# Patient Record
Sex: MALE | Race: WHITE | Employment: FULL TIME | URBAN - METROPOLITAN AREA
[De-identification: names, ages, dates, MRNs, and addresses within clinical notes are randomized per-mention and may not be internally consistent; named-entity substitution may affect disease eponyms.]

---

## 2022-02-25 ENCOUNTER — HOSPITAL ENCOUNTER (OUTPATIENT)
Dept: LAB | Age: 27
Discharge: HOME | End: 2022-02-25
Payer: COMMERCIAL

## 2022-02-25 DIAGNOSIS — R42 DIZZINESS: ICD-10-CM

## 2022-02-25 DIAGNOSIS — R20.0 NUMBNESS AND TINGLING: ICD-10-CM

## 2022-02-25 DIAGNOSIS — R20.2 NUMBNESS AND TINGLING: ICD-10-CM

## 2022-02-25 LAB
ALBUMIN SERPL-MCNC: 4.3 G/DL (ref 3.5–5)
ALBUMIN/GLOB SERPL: 1.3 {RATIO} (ref 1.2–3.5)
ALP SERPL-CCNC: 41 U/L (ref 50–136)
ALT SERPL-CCNC: 23 U/L (ref 12–65)
ANION GAP SERPL CALC-SCNC: 5 MMOL/L (ref 7–16)
AST SERPL-CCNC: 14 U/L (ref 15–37)
BILIRUB SERPL-MCNC: 0.5 MG/DL (ref 0.2–1.1)
BUN SERPL-MCNC: 17 MG/DL (ref 6–23)
CALCIUM SERPL-MCNC: 9.1 MG/DL (ref 8.3–10.4)
CHLORIDE SERPL-SCNC: 107 MMOL/L (ref 98–107)
CO2 SERPL-SCNC: 29 MMOL/L (ref 21–32)
CREAT SERPL-MCNC: 0.71 MG/DL (ref 0.8–1.5)
ERYTHROCYTE [DISTWIDTH] IN BLOOD BY AUTOMATED COUNT: 12.5 % (ref 11.9–14.6)
FOLATE SERPL-MCNC: 16.8 NG/ML (ref 3.1–17.5)
GLOBULIN SER CALC-MCNC: 3.2 G/DL (ref 2.3–3.5)
GLUCOSE SERPL-MCNC: 80 MG/DL (ref 65–100)
HCT VFR BLD AUTO: 40.8 % (ref 41.1–50.3)
HGB BLD-MCNC: 13.5 G/DL (ref 13.6–17.2)
MAGNESIUM SERPL-MCNC: 2.2 MG/DL (ref 1.8–2.4)
MCH RBC QN AUTO: 31.1 PG (ref 26.1–32.9)
MCHC RBC AUTO-ENTMCNC: 33.1 G/DL (ref 31.4–35)
MCV RBC AUTO: 94 FL (ref 79.6–97.8)
NRBC # BLD: 0 K/UL (ref 0–0.2)
PLATELET # BLD AUTO: 245 K/UL (ref 150–450)
PMV BLD AUTO: 10.3 FL (ref 9.4–12.3)
POTASSIUM SERPL-SCNC: 4 MMOL/L (ref 3.5–5.1)
PROT SERPL-MCNC: 7.5 G/DL (ref 6.3–8.2)
RBC # BLD AUTO: 4.34 M/UL (ref 4.23–5.6)
SODIUM SERPL-SCNC: 141 MMOL/L (ref 136–145)
TSH SERPL DL<=0.005 MIU/L-ACNC: 1.32 UIU/ML
VIT B12 SERPL-MCNC: 306 PG/ML (ref 193–986)
WBC # BLD AUTO: 5.5 K/UL (ref 4.3–11.1)

## 2022-02-25 PROCEDURE — 84443 ASSAY THYROID STIM HORMONE: CPT

## 2022-02-25 PROCEDURE — 80053 COMPREHEN METABOLIC PANEL: CPT

## 2022-02-25 PROCEDURE — 36415 COLL VENOUS BLD VENIPUNCTURE: CPT

## 2022-02-25 PROCEDURE — 85027 COMPLETE CBC AUTOMATED: CPT

## 2022-02-25 PROCEDURE — 82607 VITAMIN B-12: CPT

## 2022-02-25 PROCEDURE — 82525 ASSAY OF COPPER: CPT

## 2022-02-25 PROCEDURE — 83735 ASSAY OF MAGNESIUM: CPT

## 2022-02-25 PROCEDURE — 82746 ASSAY OF FOLIC ACID SERUM: CPT

## 2022-03-01 LAB — COPPER SERPL-MCNC: 83 UG/DL (ref 63–121)

## 2022-08-17 ENCOUNTER — OFFICE VISIT (OUTPATIENT)
Dept: NEUROLOGY | Age: 27
End: 2022-08-17
Payer: COMMERCIAL

## 2022-08-17 VITALS
WEIGHT: 195 LBS | BODY MASS INDEX: 28.88 KG/M2 | DIASTOLIC BLOOD PRESSURE: 76 MMHG | HEART RATE: 71 BPM | SYSTOLIC BLOOD PRESSURE: 128 MMHG | HEIGHT: 69 IN

## 2022-08-17 DIAGNOSIS — R42 PERSISTENT POSTURAL-PERCEPTUAL DIZZINESS: Primary | ICD-10-CM

## 2022-08-17 DIAGNOSIS — R26.89 ABNORMALITY OF GAIT DUE TO IMPAIRMENT OF BALANCE: ICD-10-CM

## 2022-08-17 PROCEDURE — 99213 OFFICE O/P EST LOW 20 MIN: CPT | Performed by: PSYCHIATRY & NEUROLOGY

## 2022-08-17 NOTE — PROGRESS NOTES
Negative for rash. Allergic/Immunologic: Negative for immunocompromised state. Neurological:  Positive for dizziness. Psychiatric/Behavioral:  Negative for confusion. The patient is nervous/anxious. Lab/Imaging Review:   I REVIEWED PERTINENT LABS, IMAGES, AND REPORTS WITH THE PATIENT PERSONALLY, DIRECTLY AND FULLY. THE MOST PERTINENT FINDINGS ARE NOTED BELOW:    MRI Brain September 2021:  Findings: No cerebral atrophy. Ventricular sizes are normal.  No evidence of hemorrhage or infarction. Thickened sinus mucosa is noted. Otherwise unremarkable. Lab Results   Component Value Date    CREATININE 0.71 (L) 02/25/2022    BUN 17 02/25/2022     02/25/2022    K 4.0 02/25/2022     02/25/2022    CO2 29 02/25/2022     Lab Results   Component Value Date    TSH 1.320 02/25/2022     Lab Results   Component Value Date    YWFVRBYO50 306 02/25/2022       Past Medical History:     Past medical history, surgical history, social history, family history, medications, and allergies were reviewed and updated as appropriate. PAST MEDICAL HISTORY:  Past Medical History:   Diagnosis Date    Anxiety     Dizziness      PAST SURGICAL HISTORY:   No past surgical history on file. FAMILY HISTORY:  No family history on file. SOCIAL HISTORY:  Social History     Socioeconomic History    Marital status:      Spouse name: None    Number of children: None    Years of education: None    Highest education level: None   Tobacco Use    Smoking status: Never    Smokeless tobacco: Never   Substance and Sexual Activity    Alcohol use: Not Currently       Medications/Allergies:     MEDICATIONS:   No outpatient encounter medications on file as of 8/17/2022. No facility-administered encounter medications on file as of 8/17/2022.        ALLERGIES:  No Known Allergies      Physical Exam:     /76   Pulse 71   Ht 5' 9\" (1.753 m)   Wt 195 lb (88.5 kg)   BMI 28.80 kg/m²     General Exam:  General: Well developed, well nourished, in no apparent distress. HEENT: Normocephalic, atraumatic. Sclera anicteric. Oropharynx clear. Neck: Supple without masses  Cardiovascular: Regular rate and rhythm. No carotid bruits. Lungs: Non-labored breathing. Abdomen: Soft, nontender, nondistended. Extremities: Peripheral pulses intact. No edema and no rashes. Neurological Exam:     MS/Language/Speech:  Alert. Oriented x 3. Language fluent. Speech normal.     Cranial Nerves: PERRL. Eye movements full with normal pursuits. No nystagmus. Face was symmetric with good activation and normal sensation. Tongue and palate were midline. Shoulder shrug symmetric. Motor: Strength was full in all proximal and distal muscle groups. Tone was normal.     Abnormal Movements: There was no tremor or hyperkinetic movements. Sensory: Normal to light touch throughout. Cerebellar: No ataxia or dysmetria. Reflexes (R/L): Biceps (3+/3+), Brachioradialis (3+/3+), Patellar (3+/3+), Ankle (3+/3+)  Schuler's was negative and plantar responses were flexor. Gait: Can rise from a seated position without difficulty. Posture normal.  Romberg shows some sway. Gait showed normal base with normal stride length. No difficulty turning. Arm swing was normal. Tandem gait demonstrates some mild sway but able to accomplish without any missteps. Assessment and Plan:     Bob Chaudhary is a 27 y.o. male who presents with the following issues:     Dennis was seen today for follow-up and neurologic problem. Diagnoses and all orders for this visit:    Persistent postural-perceptual dizziness    Abnormality of gait due to impairment of balance      Patient presents for follow-up and continued management of nonspecific dizziness and equilibrium. Neurologic exam continues to be relatively unremarkable with only mild balance deficits noted.   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 I have reviewed his MRI of the brain which is unremarkable with no evidence of any structural pathology or focal atrophy. There is no evidence of his exam of any obvious cerebellar dysfunction or any evidence of parkinsonism, myelopathy, or neuropathy. Serum lab work-up has also been unremarkable. Unfortunately our therapeutic options continue to be limited for his current symptoms. He has already had previous trials of medications which were poorly tolerated. We have discussed return to work precautions and perhaps if we can get him back to work with some restrictions so as he may be able to avoid having to work overhead with his neck extended he would likely be able to continue working without many of the limitations. A new letter provided today. Follow-up to be arranged as needed. Signature: Tatum Horton MD      Date:  8/21/2022    [facility]   [facility] , [facility], [address] [place] [address]  Ph: [phone]  Fax: [phone]         I have personally interviewed and examined Mr. Sid Samuel and I have personally reviewed all relevant records including labs and imaging as noted above. I have written all aspects of this note. More than 50% of this time was used for counseling regarding my diagnosis, prognosis, and plans for management. Total visit time: 27 minutes.

## 2022-08-21 ASSESSMENT — ENCOUNTER SYMPTOMS
COUGH: 0
ABDOMINAL PAIN: 0

## 2022-11-21 ENCOUNTER — TELEPHONE (OUTPATIENT)
Dept: NEUROLOGY | Age: 27
End: 2022-11-21

## 2022-11-21 NOTE — TELEPHONE ENCOUNTER
Pt called in stating that he is needing [facility] paperwork filled out because he is still having episodes. Waiting on Fax or mychart with [place] paperwork.

## 2023-07-20 ENCOUNTER — TELEPHONE (OUTPATIENT)
Dept: NEUROLOGY | Age: 28
End: 2023-07-20

## 2023-07-20 NOTE — TELEPHONE ENCOUNTER
Pt called regarding [facility] paperwork. It looks like he sent messages earlier this year and again a few days ago regarding the paperwork and stated he didn't get an answer. It's past the deadline for it, but he would like the paperwork anyway so he can reapply.

## 2023-07-21 NOTE — TELEPHONE ENCOUNTER
He will need an appointment since it has been almost a year. I am okay with adding him in a noon time slot if needed.